# Patient Record
Sex: FEMALE | Race: WHITE | NOT HISPANIC OR LATINO
[De-identification: names, ages, dates, MRNs, and addresses within clinical notes are randomized per-mention and may not be internally consistent; named-entity substitution may affect disease eponyms.]

---

## 2024-07-25 PROBLEM — Z00.00 ENCOUNTER FOR PREVENTIVE HEALTH EXAMINATION: Status: ACTIVE | Noted: 2024-07-25

## 2024-08-07 ENCOUNTER — APPOINTMENT (OUTPATIENT)
Dept: PULMONOLOGY | Facility: CLINIC | Age: 59
End: 2024-08-07

## 2024-08-07 PROCEDURE — 99204 OFFICE O/P NEW MOD 45 MIN: CPT

## 2024-08-07 NOTE — ASSESSMENT
[FreeTextEntry1] : 58-year-old female with above-mentioned history presenting for chronic dyspnea exertion that has worsened over the past few years  Dyspnea on exertion Pulmonary nodule Mild bronchiectasis  - Coronary CT from this year was reviewed personally.  Study shows calcified pulmonary nodule in the left lower lobe around the fissure, which likely is chronic and given her lack of smoking history, very low suspicion of malignancy.  She has mild bronchiectasis focus more in the central areas but otherwise does not have any consolidations, groundglass opacities, peripheral reticulations, or effusions I would suggest any other lung disease. - Spirometry that was done about 8 to 10 years ago available in the system shows some evidence of obstruction.  She has a family history of asthma (son) and will get a full PFT, FeNO, 6-minute walk test for further evaluation. - Although she has no obvious findings on CT coronaries of interstitial lung disease, she has a family history of hypersensitivity pneumonitis.  It seems that she is not exposed to similar environments, but if spirometry suggest more restriction than obstruction, may proceed to full CT scan of chest.  Follow-up in 4 to 6 weeks after PFT performed.  Sooner if needed if PFT more concordant with ILD instead of asthma, may also have her get CT prior to next visit.

## 2024-08-07 NOTE — HISTORY OF PRESENT ILLNESS
[TextBox_4] : 58-year-old female with past medical history of coronary artery disease, ADHD, anxiety, presenting for chronic dyspnea on exertion.  She has always had dyspnea on exertion for most of her life but may have become worse over the past year or 2, prompting her to be seen by cardiologist again.  Cardiac workup thus far showed stable coronary artery disease show she was referred to me for further follow-up. She has no smoking history and has no pulmonary history personally but does have family members with asthma and a sister that was recently diagnosed with chronic hypersensitivity pneumonitis which she believes was secondary to mold exposure.  They do not live together and have not since when they were teenagers. She had a pneumonia as a baby but otherwise has no pulmonary history and has never tried any inhalers [ESS] : 0

## 2024-09-16 ENCOUNTER — NON-APPOINTMENT (OUTPATIENT)
Age: 59
End: 2024-09-16

## 2024-09-27 ENCOUNTER — APPOINTMENT (OUTPATIENT)
Dept: PULMONOLOGY | Facility: CLINIC | Age: 59
End: 2024-09-27
Payer: COMMERCIAL

## 2024-09-27 VITALS
HEART RATE: 74 BPM | DIASTOLIC BLOOD PRESSURE: 72 MMHG | WEIGHT: 138 LBS | BODY MASS INDEX: 22.99 KG/M2 | HEIGHT: 65 IN | SYSTOLIC BLOOD PRESSURE: 122 MMHG | OXYGEN SATURATION: 98 %

## 2024-09-27 DIAGNOSIS — J84.9 INTERSTITIAL PULMONARY DISEASE, UNSPECIFIED: ICD-10-CM

## 2024-09-27 DIAGNOSIS — R06.09 OTHER FORMS OF DYSPNEA: ICD-10-CM

## 2024-09-27 PROCEDURE — 99214 OFFICE O/P EST MOD 30 MIN: CPT

## 2024-09-27 RX ORDER — ALBUTEROL SULFATE 90 UG/1
108 (90 BASE) INHALANT RESPIRATORY (INHALATION)
Qty: 1 | Refills: 10 | Status: ACTIVE | COMMUNITY
Start: 2024-09-27 | End: 1900-01-01

## 2024-09-27 NOTE — HISTORY OF PRESENT ILLNESS
[TextBox_4] : 58-year-old female with past medical history of coronary artery disease, ADHD, anxiety, presenting for chronic dyspnea on exertion.  She has always had dyspnea on exertion for most of her life but may have become worse over the past year or 2, prompting her to be seen by cardiologist again.  Cardiac workup thus far showed stable coronary artery disease show she was referred to me for further follow-up. She has no smoking history and has no pulmonary history personally but does have family members with asthma and a sister that was recently diagnosed with chronic hypersensitivity pneumonitis which she believes was secondary to mold exposure.  They do not live together and have not since when they were teenagers. She had a pneumonia as a baby but otherwise has no pulmonary history and has never tried any inhalers  9/27/24 Patient returns after PFT completion.  Attempts were made to contact patient but was not able to get in touch as per last chart note.  Still with some exertional shortness of breath especially after climbing inclines, stairs, or sometimes with Pilates.  No associated wheezing, coughing, or any other symptoms. [ESS] : 0

## 2024-09-27 NOTE — ASSESSMENT
[FreeTextEntry1] : 58-year-old female with above-mentioned history presenting for f/u for chronic dyspnea exertion  Dyspnea on exertion Pulmonary nodule Mild bronchiectasis  - PFT from 8/29/2024 shows no evidence of significant restrictive or obstructive ventilatory defect, and there is no significant bronchodilator response.  DLCO is within normal limits and FeNO is 8 ppb.  6-minute walk test also did not show any significant desaturations. - Despite normal lung function, she continues to have dyspnea on exertion.  Coronary CT as mentioned prior showed a calcified pulmonary nodule in the left lower lobe, but also has some groundglass opacities in the posterior lower sides which likely reflects gravity dependent atelectasis.  However, she has a family history of hypersensitivity pneumonitis as mentioned before which reportedly CT scan showed clear peripheral reticulations which is not necessarily seen on coronary CT.  However, there are limitations to visualizing the lung on coronary CT as it is not optimized for lungs, and to ensure that there is no evidence of interstitial lung disease, we will do a dedicated chest CT to further ascertain. - Although PFT does not suggest asthma, she has a strong family history of it and does have shortness of breath that is brought upon by exertion and certain seasons.  Will do methacholine challenge test to confirm but also trial albuterol.   Follow-up in 4 to 6 weeks after above testing results.  Sooner if needed

## 2024-10-18 ENCOUNTER — RESULT REVIEW (OUTPATIENT)
Age: 59
End: 2024-10-18

## 2025-09-02 ENCOUNTER — APPOINTMENT (OUTPATIENT)
Dept: ORTHOPEDIC SURGERY | Facility: CLINIC | Age: 60
End: 2025-09-02